# Patient Record
Sex: MALE | Employment: UNEMPLOYED | URBAN - METROPOLITAN AREA
[De-identification: names, ages, dates, MRNs, and addresses within clinical notes are randomized per-mention and may not be internally consistent; named-entity substitution may affect disease eponyms.]

---

## 2019-05-20 ENCOUNTER — HOSPITAL ENCOUNTER (EMERGENCY)
Age: 39
Discharge: HOME | End: 2019-05-20
Payer: COMMERCIAL

## 2019-05-20 ENCOUNTER — APPOINTMENT (OUTPATIENT)
Dept: GENERAL RADIOLOGY | Age: 39
End: 2019-05-20
Payer: COMMERCIAL

## 2019-05-20 VITALS
TEMPERATURE: 98.7 F | RESPIRATION RATE: 16 BRPM | SYSTOLIC BLOOD PRESSURE: 131 MMHG | DIASTOLIC BLOOD PRESSURE: 85 MMHG | OXYGEN SATURATION: 98 % | HEART RATE: 69 BPM

## 2019-05-20 DIAGNOSIS — T18.9XXA FOREIGN BODY INGESTION, INITIAL ENCOUNTER: Primary | ICD-10-CM

## 2019-05-20 PROCEDURE — 99283 EMERGENCY DEPT VISIT LOW MDM: CPT

## 2019-05-20 PROCEDURE — 74019 RADEX ABDOMEN 2 VIEWS: CPT

## 2019-05-20 ASSESSMENT — PAIN SCALES - GENERAL: PAINLEVEL_OUTOF10: 0

## 2019-05-20 NOTE — ED TRIAGE NOTES
pt swallowed screw last Friday while holding it in his mouth working on the house. . has not been able to locate it in stool.  Denies pain

## 2019-05-21 NOTE — ED NOTES
Dc instructions completed with pt. Pt verbalized understanding. Pt was ambulatory to exit with wife.       Millie Garcia RN  05/20/19 4866

## 2019-05-21 NOTE — ED PROVIDER NOTES
[address] [id number] [facility] [address]  Dept: [phone]  Loc: [phone]    eMERGENCY dEPARTMENT eNCOUnter    Evaluated by the Advanced Practice Provider    CHIEF COMPLAINT    Chief Complaint   Patient presents with    Foreign Body     pt swallowed screw last friday. has not been able to locate it in stool        HPI    Leslie Bonner is a 34 y.o. male who presents with foreign body ingestion. Onset was 3 days ago. The context was that the patient was holding a screw in his mouth while working and swallowed it by mistake. He denies any associated abdominal pain. REVIEW OF SYSTEMS    GI: see HPI, no abdominal pain, no vomiting, no hematochezia  Pulmonary: No cough, no difficulty breathing  General: No fevers    PAST MEDICAL & SURGICAL HISTORY    History reviewed. No pertinent past medical history.   Past Surgical History:   Procedure Laterality Date    TONSILLECTOMY         CURRENT MEDICATIONS    Current Outpatient Rx   Medication Sig Dispense Refill    ondansetron (ZOFRAN) 4 MG tablet Take 1 tablet by mouth every 8 hours as needed for Nausea 10 tablet 0    ibuprofen (IBU) 400 MG tablet Take 1 tablet by mouth every 6 hours as needed for Pain 120 tablet 0       ALLERGIES    No Known Allergies    SOCIAL AND FAMILY HISTORY    Social History     Socioeconomic History    Marital status:      Spouse name: None    Number of children: None    Years of education: None    Highest education level: None   Occupational History    None   Social Needs    Financial resource strain: None    Food insecurity:     Worry: None     Inability: None    Transportation needs:     Medical: None     Non-medical: None   Tobacco Use    Smoking status: Never Smoker    Smokeless tobacco: Never Used   Substance and Sexual Activity    Alcohol use: No    Drug use: No    Sexual activity: None   Lifestyle    Physical activity: Days per week: None     Minutes per session: None    Stress: None   Relationships    Social connections:     Talks on phone: None     Gets together: None     Attends [employer] service: None     Active member of club or organization: None     Attends meetings of clubs or organizations: None     Relationship status: None    Intimate partner violence:     Fear of current or ex partner: None     Emotionally abused: None     Physically abused: None     Forced sexual activity: None   Other Topics Concern    None   Social History Narrative    None     History reviewed. No pertinent family history. PHYSICAL EXAM    VITAL SIGNS: /85   Pulse 69   Temp 98.7 °F (37.1 °C)   Resp 16   SpO2 98%   Constitutional:  Well developed, well nourished, no acute distress  HENT:  Atraumatic, moist mucus membranes, throat is clear, no foreign body visualized, no throat swelling or other obstructive process, no trismus  Neck: Supple, no masses, trachea midline  Respiratory:  No retractions  Cardiovascular:  Regular rate  GI:  Soft, no abdominal tenderness, no guarding, bowel sounds present, no audible bruits or palpable pulsatile masses. Musculoskeletal:  No edema, no acute deformity   Integument: No rash, dry skin    RADIOLOGY/PROCEDURES    XR ABDOMEN (2 VIEWS)   Final Result   Ingested metallic foreign body. ED COURSE & MEDICAL DECISION MAKING    Pertinent Labs & Imaging studies reviewed and interpreted. (See chart for details)     See chart for details of medications given during the ED stay. Differential diagnosis: ingestion of magnet, ingestion of disc battery, ingestion of sharp object, airway compromise, esophageal obstruction, esophageal perforation, esophageal caustic injury, other    Patient is afebrile and nontoxic in appearance. No abdominal pain or tenderness. Plain films as above.     Consultation with general surgery at 8:30 PM: I spoke with Dr. Reynold Godwin, and he said that the patient will likely pass the screw as it is already at the cecum. He said that the patient should return to the ED only if experiencing abdominal pain. He said he can have repeat imaging in 2 days to confirm that the screw is passing, he can follow up with primary care as an outpatient. The patient was instructed to follow up as an outpatient in 2 days. The patient was instructed to return to the ED immediately for any new or worsening symptoms. The patient verbalized understanding. I have evaluated this patient. My supervising physician was available for consultation. FINAL IMPRESSION    1.  Foreign body ingestion, initial encounter        PLAN  Discharge with close outpatient follow-up (see EMR)     (Please note that this note was completed with a voice recognition program.  Every attempt was made to edit the dictations, but inevitably there remain words that are mis-transcribed.)            Eyal Lawlerma  05/20/19 0153